# Patient Record
Sex: MALE | Race: WHITE
[De-identification: names, ages, dates, MRNs, and addresses within clinical notes are randomized per-mention and may not be internally consistent; named-entity substitution may affect disease eponyms.]

---

## 2020-02-12 ENCOUNTER — HOSPITAL ENCOUNTER (EMERGENCY)
Dept: HOSPITAL 63 - ER | Age: 40
Discharge: HOME | End: 2020-02-12
Payer: SELF-PAY

## 2020-02-12 VITALS — BODY MASS INDEX: 28.69 KG/M2 | HEIGHT: 70 IN | WEIGHT: 200.4 LBS

## 2020-02-12 VITALS — SYSTOLIC BLOOD PRESSURE: 120 MMHG | DIASTOLIC BLOOD PRESSURE: 74 MMHG

## 2020-02-12 DIAGNOSIS — F17.210: ICD-10-CM

## 2020-02-12 DIAGNOSIS — R05: Primary | ICD-10-CM

## 2020-02-12 PROCEDURE — 99283 EMERGENCY DEPT VISIT LOW MDM: CPT

## 2020-02-12 NOTE — PHYS DOC
Past History


Past Medical History:  No Pertinent History


Past Surgical History:  No Surgical History


Alcohol Use:  Rarely





Adult General


Chief Complaint


Chief Complaint:  COUGH





HPI


HPI





Patient is a [39-year-old male presenting with cough. 3 weeks yellow sputum 

shortness of breath worse with exertion does have a long smoking history smokes 

a pack per day. He said fevers on and off feeling some chills at times. Symptoms

 are worsening despite treatment with Mucinex. Past medical history none 

medications none allergies none patient has not seen a doctor in 15 years.





Review of Systems


Review of Systems





Constitutional: 


: Denies dysuria or hematuria []


Musculoskeletal: Denies back pain or joint pain []


Integument: Denies rash or skin lesions []


Neurologic: Denies headache, focal weakness or sensory changes []


Endocrine: Denies polyuria or polydipsia []





All other systems were reviewed and found to be within normal limits, except as 

documented in this note.





Allergies


Allergies





Allergies








Coded Allergies Type Severity Reaction Last Updated Verified


 


  No Known Drug Allergies    2/12/20 No











Physical Exam


Physical Exam





Constitutional: Well developed, well nourished, no acute distress, non-toxic 

appearance. []


HENT: Normocephalic, atraumatic, bilateral external ears normal, oropharynx 

moist, no oral exudates, nose normal. []


Eyes: PERRLA, EOMI, conjunctiva normal, no discharge. [] 


Neck: Normal range of motion, no tenderness, supple, no stridor. [] 


Cardiovascular:Heart rate regular rhythm, no murmur []


Lungs & Thorax:  Bilateral breath sounds clear to auscultation []faint scattered

 wheezing otherwise normal no rhonchi appreciated


Abdomen: Bowel sounds normal, soft, no tenderness, no masses, no pulsatile 

masses. [] 


Skin: Warm, dry, no erythema, no rash. [] 


Back: No tenderness, no CVA tenderness. [] 


Extremities: No tenderness, no cyanosis, no clubbing, ROM intact, no edema. [] 


Neurologic: Alert and oriented X 3, normal motor function, normal sensory 

function, no focal deficits noted. []


Psychologic: Affect normal, judgement normal, mood normal. []





Current Patient Data


Vital Signs





                                   Vital Signs








  Date Time  Temp Pulse Resp B/P (MAP) Pulse Ox O2 Delivery O2 Flow Rate FiO2


 


2/12/20 14:40 98.1 85 18 120/74 (89) 98 Room Air  











EKG


EKG


[]





Radiology/Procedures


Radiology/Procedures


[]





Course & Med Decision Making


Course & Med Decision Making


Pertinent Labs and Imaging studies reviewed. (See chart for details)





[]This is an overall well-appearing 39-year-old smoker presenting with 3 weeks 

of cough with productive sputum. Bilateral lung sounds were symmetric there may 

been some faint wheezing however. At this point time I think given the timeframe

 and a smoking history antibiotic treatment is reasonable I did instruct him to 

come back in for 5 days for repeat evaluation and/or imaging should his symptoms

 not improve as expected.





Dragon Disclaimer


Dragon Disclaimer


This electronic medical record was generated, in whole or in part, using a voice

 recognition dictation system.





Departure


Departure:


Impression:  


   Primary Impression:  


   Cough


Disposition:  01 HOME, SELF-CARE


Condition:  STABLE


Patient Instructions:  Cough, Adult, Easy-to-Read


Scripts


Albuterol Sulfate (PROAIR HFA INHALER) 8.5 Gm Hfa.aer.ad


2 PUFF IH PRN Q4-6HRS PRN for wheezing for 21 Days, #1 INHALER 0 Refills


   Prov: JONATHAN ANTOINE MD         2/12/20 


Doxycycline Hyclate (DOXYCYCLINE HYCLATE) 100 Mg Tablet


1 TAB PO BID for BRONCHITIS, #20 TAB


   Prov: JONATHAN ANTOINE MD         2/12/20











JONATHAN ANTOINE MD            Feb 12, 2020 14:46